# Patient Record
Sex: FEMALE | Race: WHITE | NOT HISPANIC OR LATINO | Employment: FULL TIME | URBAN - METROPOLITAN AREA
[De-identification: names, ages, dates, MRNs, and addresses within clinical notes are randomized per-mention and may not be internally consistent; named-entity substitution may affect disease eponyms.]

---

## 2017-10-03 ENCOUNTER — OFFICE VISIT (OUTPATIENT)
Dept: FAMILY MEDICINE | Facility: CLINIC | Age: 37
End: 2017-10-03
Payer: COMMERCIAL

## 2017-10-03 VITALS
SYSTOLIC BLOOD PRESSURE: 110 MMHG | HEART RATE: 72 BPM | HEIGHT: 62 IN | OXYGEN SATURATION: 98 % | DIASTOLIC BLOOD PRESSURE: 68 MMHG | BODY MASS INDEX: 31.34 KG/M2 | WEIGHT: 170.31 LBS

## 2017-10-03 DIAGNOSIS — Z23 IMMUNIZATION DUE: ICD-10-CM

## 2017-10-03 DIAGNOSIS — Z00.00 ENCOUNTER FOR PREVENTIVE HEALTH EXAMINATION: Primary | ICD-10-CM

## 2017-10-03 DIAGNOSIS — G43.011 INTRACTABLE MIGRAINE WITHOUT AURA AND WITH STATUS MIGRAINOSUS: ICD-10-CM

## 2017-10-03 DIAGNOSIS — F42.2 MIXED OBSESSIONAL THOUGHTS AND ACTS: ICD-10-CM

## 2017-10-03 PROCEDURE — 99395 PREV VISIT EST AGE 18-39: CPT | Mod: 25,S$GLB,,

## 2017-10-03 PROCEDURE — 99999 PR PBB SHADOW E&M-EST. PATIENT-LVL III: CPT | Mod: PBBFAC,,,

## 2017-10-03 PROCEDURE — 90471 IMMUNIZATION ADMIN: CPT | Mod: S$GLB,,,

## 2017-10-03 PROCEDURE — 90715 TDAP VACCINE 7 YRS/> IM: CPT | Mod: S$GLB,,,

## 2017-10-03 RX ORDER — BUSPIRONE HYDROCHLORIDE 5 MG/1
5 TABLET ORAL 2 TIMES DAILY
Qty: 60 TABLET | Refills: 11 | Status: SHIPPED | OUTPATIENT
Start: 2017-10-03 | End: 2018-10-09 | Stop reason: SDUPTHER

## 2017-10-03 NOTE — PROGRESS NOTES
Subjective:       Patient ID: Padma Benitez is a 37 y.o. female.    Chief Complaint: Annual Exam    HPI Data obtained from [employer] and [employer] (AHRQ):    GRADE A -The [employer] recommends the service. There is high certainty that the net benefit is substantial. Offer or provide this service.    GRADE B - The [employer]PSTF recommends the service. There is high certainty that the net benefit is moderate or there is moderate certainty that the net benefit is moderate to substantial. Offer or provide this service.    View All    19 - Recommended (A, B)    Grade Title Risk Info. Details   May 2015 Cervical Cancer: Screening -- Women 21 to 65 (Pap Smear) or 30-65 (in combo with HPV testing)     BTL  Folic Acid for the Prevention of Neural Tube Defects: Preventive Medication --Women who are planning or capable of pregnancy     Low  HIV: Screening - Adolescents and Adults     Normal  High Blood Pressure: Screening and Home Monitoring -- Adults     Low  Syphilis: Screening --Asymptomatic, nonpregnant adults and adolescents who are at increased risk for syphilis infection     Denies  Alcohol Misuse: Screening and Behavioral Counseling Interventions in Primary Care -- Adults     Low  BRCA-Related Cancer: Risk Assessment, Genetic Counseling, & Genetic Testing -- Women at Increased Risk     Low  Breast Cancer: Preventive Medications -- Women At Increased Risk     Low  Breastfeeding: Primary Care Preventions --Pregnant women, new mothers, and their children     Low  Chlamydia: Screening -- Sexually Active Women     Denies  Depression: Screening -- General adult population, including pregnant and postpartum women     Low  Gonorrhea: Screening -- Sexually Active Women     Yes  Healthful Diet and Physical Activity for CVD Disease Prevention: Counseling -- Adults with CVD Risk Factors     Low  Hepatitis B: Screening -- Nonpregnant Adolescents and Adults At High Risk     Not a candidate  Hepatitis C Virus  Infection: Screening--Adults at High Risk and Adults born between 1945 and 1965     Denies  Intimate Partner Violence: Screening -- Women Childbearing Age     Low  Latent Tuberculosis Infection: Screening -- Asymptomatic adults at increased risk for infection     Stage one She has lost 60 lbs in the past 2 years Obesity: Screening for and Management of-- All Adults       Low  Sexually Transmitted Infections: Behavioral Counseling -- Sexually Active Adolescents and Adults         Review of Systems   Constitutional: Negative.  Negative for activity change, appetite change, fatigue and unexpected weight change.   HENT: Negative.  Negative for hearing loss, rhinorrhea and trouble swallowing.    Eyes: Negative.  Negative for discharge and visual disturbance.   Respiratory: Negative.  Negative for chest tightness and wheezing.    Cardiovascular: Negative.  Negative for chest pain and palpitations.   Gastrointestinal: Negative.  Negative for blood in stool, constipation, diarrhea and vomiting.   Endocrine: Negative.  Negative for polydipsia and polyuria.   Genitourinary: Positive for menstrual problem. Negative for difficulty urinating, dysuria and hematuria.   Musculoskeletal: Positive for arthralgias. Negative for joint swelling and neck pain.   Skin: Negative.    Allergic/Immunologic: Negative.    Neurological: Negative.  Negative for weakness and headaches.   Hematological: Negative.    Psychiatric/Behavioral: Negative for confusion and dysphoric mood. The patient is nervous/anxious.    All other systems reviewed and are negative.      Objective:      Vitals:    10/03/17 0759   BP: 110/68   Pulse: 72     Physical Exam   Constitutional: She is oriented to person, place, and time. She appears well-developed and well-nourished. She is active.  Non-toxic appearance. She does not have a sickly appearance. She does not appear ill. No distress.   HENT:   Head: Normocephalic and atraumatic.   Right Ear: External ear normal.    Left Ear: External ear normal.   Nose: Nose normal.   Hearing normal.    Eyes: Conjunctivae are normal. Pupils are equal, round, and reactive to light.   Neck: Normal range of motion. Neck supple. No thyroid mass and no thyromegaly present.   Cardiovascular: Normal rate, regular rhythm, normal heart sounds and intact distal pulses.  Exam reveals no gallop and no friction rub.    No murmur heard.  Pulses:       Dorsalis pedis pulses are 2+ on the right side, and 2+ on the left side.        Posterior tibial pulses are 2+ on the right side, and 2+ on the left side.   Pulmonary/Chest: Effort normal and breath sounds normal. No respiratory distress. She exhibits no tenderness.   Musculoskeletal: Normal range of motion. She exhibits no edema.   Lymphadenopathy:     She has no cervical adenopathy.   Neurological: She is alert and oriented to person, place, and time. She has normal strength. Coordination and gait normal.   Skin: Skin is warm and dry. She is not diaphoretic. No pallor.   Psychiatric: She has a normal mood and affect. Her speech is normal and behavior is normal. Judgment and thought content normal. Cognition and memory are normal.   Vitals reviewed.      Assessment:       1. Encounter for preventive health examination    2. Immunization due    3. Intractable migraine without aura and with status migrainosus    4. Mixed obsessional thoughts and acts        Plan:       Encounter for preventive health examination    Immunization due  -     (In Office Administered) Tdap Vaccine    Intractable migraine without aura and with status migrainosus    Mixed obsessional thoughts and acts    Other orders  -     busPIRone (BUSPAR) 5 MG Tab; Take 1 tablet (5 mg total) by mouth 2 (two) times daily.  Dispense: 60 tablet; Refill: 11      Return in about 1 year (around 10/3/2018), or if symptoms worsen or fail to improve.

## 2017-10-19 RX ORDER — TOPIRAMATE 50 MG/1
TABLET, FILM COATED ORAL
Qty: 60 TABLET | Refills: 11 | Status: SHIPPED | OUTPATIENT
Start: 2017-10-19 | End: 2018-10-09 | Stop reason: SDUPTHER

## 2018-01-08 PROBLEM — Z00.00 ENCOUNTER FOR PREVENTIVE HEALTH EXAMINATION: Status: RESOLVED | Noted: 2017-10-03 | Resolved: 2018-01-08

## 2018-07-16 ENCOUNTER — HOSPITAL ENCOUNTER (OUTPATIENT)
Dept: RADIOLOGY | Facility: HOSPITAL | Age: 38
Discharge: HOME | End: 2018-07-16
Attending: PHYSICIAN ASSISTANT
Payer: COMMERCIAL

## 2018-07-16 ENCOUNTER — OFFICE VISIT (OUTPATIENT)
Dept: ORTHOPEDICS | Facility: CLINIC | Age: 38
End: 2018-07-16
Payer: COMMERCIAL

## 2018-07-16 VITALS — WEIGHT: 170.75 LBS | HEIGHT: 62 IN | BODY MASS INDEX: 31.42 KG/M2

## 2018-07-16 DIAGNOSIS — G89.29 CHRONIC PAIN OF LEFT ANKLE: Primary | ICD-10-CM

## 2018-07-16 DIAGNOSIS — M25.572 CHRONIC PAIN OF LEFT ANKLE: Primary | ICD-10-CM

## 2018-07-16 DIAGNOSIS — M25.572 LEFT ANKLE PAIN, UNSPECIFIED CHRONICITY: ICD-10-CM

## 2018-07-16 PROCEDURE — 99203 OFFICE O/P NEW LOW 30 MIN: CPT | Mod: S$GLB,,, | Performed by: PHYSICIAN ASSISTANT

## 2018-07-16 PROCEDURE — 73610 X-RAY EXAM OF ANKLE: CPT | Mod: TC,LT

## 2018-07-16 PROCEDURE — 73610 X-RAY EXAM OF ANKLE: CPT | Mod: 26,LT,, | Performed by: RADIOLOGY

## 2018-07-16 PROCEDURE — 99999 PR PBB SHADOW E&M-EST. PATIENT-LVL III: CPT | Mod: PBBFAC,,, | Performed by: PHYSICIAN ASSISTANT

## 2018-07-16 PROCEDURE — 3008F BODY MASS INDEX DOCD: CPT | Mod: CPTII,S$GLB,, | Performed by: PHYSICIAN ASSISTANT

## 2018-07-16 NOTE — PROGRESS NOTES
Subjective:      Patient ID: Padma Benitez is a 38 y.o. female.    Chief Complaint: Pain of the Left Ankle    HPI  38 year old female presents with chief complaint intermittent left ankle pain since November 2017. She fell down some stairs when she was in [place]. She had pain and a lot of swelling. She went to an urgent care and says her x-rays were negative for fx. She wore an air cast for 2-3 weeks. She reports pain at the lateral ankle. It is worse at night and in the mornings. She does not take medicine for it. She denies swelling and giving way. She also reports spraining the ankle about 7 years ago.   Review of Systems   Constitution: Negative for chills, fever and night sweats.   Cardiovascular: Negative for chest pain.   Respiratory: Negative for cough and shortness of breath.    Hematologic/Lymphatic: Does not bruise/bleed easily.   Skin: Negative for color change.   Gastrointestinal: Negative for heartburn.   Genitourinary: Negative for dysuria.   Neurological: Negative for numbness and paresthesias.   Psychiatric/Behavioral: Negative for altered mental status.   Allergic/Immunologic: Negative for persistent infections.         Objective:            General    Vitals reviewed.  Constitutional: She is oriented to person, place, and time. She appears well-developed and well-nourished.   Cardiovascular: Normal rate.    Neurological: She is alert and oriented to person, place, and time.         Left Ankle/Foot Exam     Inspection  Erythema: absent    Tenderness   The patient is tender to palpation of the ATF, CF ligament and peroneals.    Range of Motion   The patient has normal left ankle ROM.   Montes Test:  normal    Other   Sensation: normal    Comments:  No swelling.       Vascular Exam       Left Pulses  Dorsalis Pedis:      2+              X-ray: ordered and reviewed by myself. No fx or dislocation.         Assessment:       Encounter Diagnosis   Name Primary?    Chronic pain of left ankle Yes           Plan:       MRI was ordered to r/o ligamentous injury/tear. RTC pending results.

## 2018-10-09 ENCOUNTER — OFFICE VISIT (OUTPATIENT)
Dept: FAMILY MEDICINE | Facility: CLINIC | Age: 38
End: 2018-10-09
Payer: COMMERCIAL

## 2018-10-09 VITALS
OXYGEN SATURATION: 97 % | WEIGHT: 179.69 LBS | TEMPERATURE: 99 F | BODY MASS INDEX: 33.07 KG/M2 | HEART RATE: 76 BPM | SYSTOLIC BLOOD PRESSURE: 106 MMHG | DIASTOLIC BLOOD PRESSURE: 70 MMHG | HEIGHT: 62 IN

## 2018-10-09 DIAGNOSIS — F42.9 OBSESSIVE-COMPULSIVE DISORDER, UNSPECIFIED TYPE: ICD-10-CM

## 2018-10-09 DIAGNOSIS — F41.9 ANXIETY: ICD-10-CM

## 2018-10-09 DIAGNOSIS — G43.809 OTHER MIGRAINE WITHOUT STATUS MIGRAINOSUS, NOT INTRACTABLE: Primary | ICD-10-CM

## 2018-10-09 PROCEDURE — 3008F BODY MASS INDEX DOCD: CPT | Mod: CPTII,S$GLB,, | Performed by: FAMILY MEDICINE

## 2018-10-09 PROCEDURE — 99214 OFFICE O/P EST MOD 30 MIN: CPT | Mod: S$GLB,,, | Performed by: FAMILY MEDICINE

## 2018-10-09 PROCEDURE — 99999 PR PBB SHADOW E&M-EST. PATIENT-LVL III: CPT | Mod: PBBFAC,,, | Performed by: FAMILY MEDICINE

## 2018-10-09 RX ORDER — BUSPIRONE HYDROCHLORIDE 5 MG/1
5 TABLET ORAL 2 TIMES DAILY
Qty: 60 TABLET | Refills: 11 | Status: SHIPPED | OUTPATIENT
Start: 2018-10-09 | End: 2019-10-09

## 2018-10-09 RX ORDER — TOPIRAMATE 50 MG/1
50 TABLET, FILM COATED ORAL 2 TIMES DAILY
Qty: 60 TABLET | Refills: 11 | Status: SHIPPED | OUTPATIENT
Start: 2018-10-09

## 2018-10-09 NOTE — PROGRESS NOTES
Subjective:       Patient ID: Padma Benitez is a 38 y.o. female.    Chief Complaint: Anxiety and Discuss Meds    Patient presents with concerns about her Topamax. She states she was on this for migraine headaches. She states she was up to 200 mg and decreased it down to 50 mg BID. She states she now has paranoia and anxiety. She states she has been noticing that she is getting more paranoid or anxious about things she shouldn't be worried about. She states she feels she has been obsessive over things.  She constantly checks her boyfriend's whereabouts and it is not necessary. She has been on Topamax and is not taking this regularly. She was taking 100 mg daily.     She was taking buspirone for her anxiety and OCD. She stopped this 2 months ago as well.       Past Medical History:  No date: Anxiety  No date: Depression  No date: Endometriosis  No date: Gallbladder colic  No date: Migraine headache   Past Surgical History:  No date:  SECTION      Comment:  x2  No date: scar tissue removed from bowel  No date: TUBAL LIGATION  Review of patient's family history indicates:  Problem: Cancer      Relation: Maternal Grandmother          Age of Onset: (Not Specified)          Comment: cervical or ovarian  Problem: Cancer      Relation: Sister          Age of Onset: (Not Specified)          Comment: pre-cervical cancer  Problem: Anxiety disorder      Relation: Sister          Age of Onset: (Not Specified)  Problem: Anxiety disorder      Relation: Mother          Age of Onset: (Not Specified)    Social History    Socioeconomic History      Marital status: Legally       Spouse name: Not on file      Number of children: Not on file      Years of education: Not on file      Highest education level: Not on file    Social Needs      Financial resource strain: Not on file      Food insecurity - worry: Not on file      Food insecurity - inability: Not on file      Transportation needs - medical: Not on file     "  Transportation needs - non-medical: Not on file    Occupational History      Occupation: teacher        Comment: teacher special education    Tobacco Use      Smoking status: Never Smoker      Smokeless tobacco: Never Used    Substance and Sexual Activity      Alcohol use: Yes        Alcohol/week: 1.2 oz        Types: 2 Glasses of wine per week      Drug use: No      Sexual activity: Yes        Partners: Female    Other Topics      Concerns:        Not on file    Social History Narrative      Lives in [place] with her 2 sons in a townhouse. She is . 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 She has shared custody.            Review of Systems    Objective:       Vitals:    10/09/18 0919   BP: 106/70   Pulse: 76   Temp: 98.5 °F (36.9 °C)   TempSrc: Oral   SpO2: 97%   Weight: 81.5 kg (179 lb 10.8 oz)   Height: 5' 2" (1.575 m)       Physical Exam   Constitutional: She is oriented to person, place, and time. She appears well-developed and well-nourished. No distress.   HENT:   Head: Normocephalic and atraumatic.   Eyes: Conjunctivae are normal.   Neck: Normal range of motion. Neck supple. Carotid bruit is not present.   Cardiovascular: Normal rate, regular rhythm and normal heart sounds. Exam reveals no gallop and no friction rub.   No murmur heard.  Pulmonary/Chest: Effort normal and breath sounds normal. No respiratory distress. She has no wheezes. She has no rales.   Musculoskeletal: She exhibits no edema.   Neurological: She is alert and oriented to person, place, and time.   Skin: She is not diaphoretic.       Assessment:       1. Other migraine without status migrainosus, not intractable    2. Anxiety    3. Obsessive-compulsive disorder, unspecified type        Plan:       Padma was seen today for anxiety and discuss meds.    Diagnoses and all orders for this visit:    Other migraine without status migrainosus, not intractable  -     topiramate (TOPAMAX) 50 MG tablet; Take 1 tablet (50 mg " total) by mouth 2 (two) times daily.  Restarting topamax for migraine prevention   Anxiety  -     busPIRone (BUSPAR) 5 MG Tab; Take 1 tablet (5 mg total) by mouth 2 (two) times daily.  Restarting her buspar as I feel this is the cause of her anxiety and OCD as she is off her medication.     Obsessive-compulsive disorder, unspecified type  -     busPIRone (BUSPAR) 5 MG Tab; Take 1 tablet (5 mg total) by mouth 2 (two) times daily.

## 2019-01-24 ENCOUNTER — TELEPHONE (OUTPATIENT)
Dept: FAMILY MEDICINE | Facility: CLINIC | Age: 39
End: 2019-01-24

## 2020-10-05 ENCOUNTER — PATIENT MESSAGE (OUTPATIENT)
Dept: ADMINISTRATIVE | Facility: HOSPITAL | Age: 40
End: 2020-10-05

## 2020-11-15 NOTE — TELEPHONE ENCOUNTER
----- Message from Ai Irwin sent at 1/24/2019  8:21 AM CST -----  Contact: Debra with [facility] in [place], [place] -[phone]  Rep asking for last office notes from 10/09  Fax # [phone]   - - -

## 2021-01-05 ENCOUNTER — PATIENT MESSAGE (OUTPATIENT)
Dept: ADMINISTRATIVE | Facility: HOSPITAL | Age: 41
End: 2021-01-05

## 2021-04-06 ENCOUNTER — PATIENT MESSAGE (OUTPATIENT)
Dept: ADMINISTRATIVE | Facility: HOSPITAL | Age: 41
End: 2021-04-06

## 2021-07-07 ENCOUNTER — PATIENT MESSAGE (OUTPATIENT)
Dept: ADMINISTRATIVE | Facility: HOSPITAL | Age: 41
End: 2021-07-07

## 2021-10-04 ENCOUNTER — PATIENT MESSAGE (OUTPATIENT)
Dept: ADMINISTRATIVE | Facility: HOSPITAL | Age: 41
End: 2021-10-04